# Patient Record
Sex: FEMALE | Race: WHITE | NOT HISPANIC OR LATINO
[De-identification: names, ages, dates, MRNs, and addresses within clinical notes are randomized per-mention and may not be internally consistent; named-entity substitution may affect disease eponyms.]

---

## 2020-01-22 PROBLEM — Z00.00 ENCOUNTER FOR PREVENTIVE HEALTH EXAMINATION: Status: ACTIVE | Noted: 2020-01-22

## 2020-01-31 ENCOUNTER — TRANSCRIPTION ENCOUNTER (OUTPATIENT)
Age: 25
End: 2020-01-31

## 2020-01-31 ENCOUNTER — LABORATORY RESULT (OUTPATIENT)
Age: 25
End: 2020-01-31

## 2020-01-31 ENCOUNTER — APPOINTMENT (OUTPATIENT)
Dept: NEUROLOGY | Facility: CLINIC | Age: 25
End: 2020-01-31
Payer: COMMERCIAL

## 2020-01-31 VITALS
HEART RATE: 76 BPM | SYSTOLIC BLOOD PRESSURE: 124 MMHG | HEIGHT: 68 IN | WEIGHT: 130 LBS | OXYGEN SATURATION: 100 % | DIASTOLIC BLOOD PRESSURE: 76 MMHG | BODY MASS INDEX: 19.7 KG/M2 | TEMPERATURE: 98.4 F

## 2020-01-31 DIAGNOSIS — Z78.9 OTHER SPECIFIED HEALTH STATUS: ICD-10-CM

## 2020-01-31 DIAGNOSIS — Z87.2 PERSONAL HISTORY OF DISEASES OF THE SKIN AND SUBCUTANEOUS TISSUE: ICD-10-CM

## 2020-01-31 DIAGNOSIS — R46.81 OBSESSIVE-COMPULSIVE BEHAVIOR: ICD-10-CM

## 2020-01-31 DIAGNOSIS — F32.9 ANXIETY DISORDER, UNSPECIFIED: ICD-10-CM

## 2020-01-31 DIAGNOSIS — F41.9 ANXIETY DISORDER, UNSPECIFIED: ICD-10-CM

## 2020-01-31 PROCEDURE — 99205 OFFICE O/P NEW HI 60 MIN: CPT

## 2020-01-31 RX ORDER — DOXEPIN HYDROCHLORIDE 25 MG/1
25 CAPSULE ORAL DAILY
Refills: 0 | Status: ACTIVE | COMMUNITY

## 2020-01-31 RX ORDER — FLUVOXAMINE MALEATE 100 MG/1
100 TABLET, FILM COATED ORAL TWICE DAILY
Refills: 0 | Status: ACTIVE | COMMUNITY

## 2020-01-31 NOTE — PHYSICAL EXAM
[FreeTextEntry1] : Physical Exam\par Constitutional: no apparent distress\par Psychiatric: normal affect, euthyhmic, alert and oriented x 3\par HEENT: moist mucous membranes, oropharynx clear\par Neck: supple, no lymphadenopathy\par Respiratory: clear to auscultation bilaterally, no crackles or wheezing\par Cardiovascular: regular rate and rhythm, normal S1/S2, no murmurs, no edema\par GI: soft, non-tender, no distended, bowel sounds present; no hepatosplenomegaly on palpation\par Musculoskeletal: extremities warm, well-perfused, normal range of motion\par Skin: no rashes, bruises, or lesions\par \par Neurologic Exam:\par Mental Status: MOCA 29/30 (missed 1 word on delayed recall)\par Cranial Nerves: I: deferred; II: pupils equal round and reactive, visual fields full to confrontation, discs sharp bilaterally; III, IV, VI: extraocular movements full with no nystagmus; V: facial sensation intact and symmetric; VII: facial power symmetric; VIII: hearing intact to finger rub; IX/X: palate elevates symmetrically, no dysarthria; XI: shoulder shrug symmetric; XII: tongue protrudes midline\par Motor: normal bulk and tone, no orbiting or pronator drift, power 5/5 to confrontation throughout including shoulder abduction, elbow flexion and extension, wrist flexion and extension, hip flexion, knee flexion and extension, plantarflexion, and dorsiflexion, no abnormal movements\par Sensation: intact to light touch in distal upper and lower extremities bilaterally\par Coordination: finger-nose-finger intact bilaterally\par Reflexes: 2+ biceps, triceps, brachioradialis, patella\par Gait: narrow base, normal stance and stride, normal arm swing

## 2020-01-31 NOTE — HISTORY OF PRESENT ILLNESS
[FreeTextEntry1] : 24-year-old woman who presents with both parents for evaluation of possible inflammatory condition of the nervous system.\par \par Patient and parents report that she has a long history of psychiatric issues.  She had normal birth and development until age 9.  At 9 she was diagnosed with scarlet fever.  Soon afterward she began to have severe anxiety, became easily overwhelmed, and developed compulsions (showering rituals, specific ways she needed things to be done).  Throughout middle and high school she continued to have these symptoms and tried multiple antidepressants without improvement.  \par \par In college these symptoms continued and she also developed "brain fog," difficulty concentrating, abdominal distress, decreased PO intake, intermittent severe depression.  Tried several more antidepressants and stimulants with minimal improvement.\par \par Over these years also had several strep infections as well as mono.  She was diagnosed with chronic inflammatory response syndrome.  She had a tonsillectomy in 2017 due to concern that she may have PANDAS and that strep infections may be related to her psychiatric symptoms.  Has had multiple courses of antibiotics without much change.  \par \par Over the past month her symptoms have dramatically worsening.  She has severe compulsions, will not let anyone else clean the house or prepare meals as she needs it done a certain way.  Spends hours in the shower as she has to do everything in a specific order.  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  Has not been able to eat and has lost 15 pounds.  Recently started a course of PO prednisone which she and parents say has helped somewhat.\par \par

## 2020-01-31 NOTE — DATA REVIEWED
[de-identified] : MRI brain personally reviewed, normal on my read though NeuroQuant read states moderate atrophy for age.  No hyperintensity.

## 2020-01-31 NOTE — DISCUSSION/SUMMARY
[FreeTextEntry1] : 24-year-old woman with very long history of OCD, anxiety, and depression, without response to psychiatric medications, and with recent possible response (though incomplete) to oral steroids.  Differential diagnosis includes primary psychiatric disease vs. inflammatory disorder of the nervous system such as limbic encephalitis.  15 year duration and normal cognitive/neurologic exams as well as normal MRI argue against CNS inflammation, though possible response to prednisone is encouraging.  \par \par Recommend lumbar puncture to look for evidence of inflammation, serum and CSF autoantibody testing, video EEG to look for unrecognized seizures, and PET scan to evaluate for hypometabolism.  If all normal, unlikely this is inflammatory and would not recommend treatment.  If testing is suggestive of inflammation, will initiate treatment with high dose IV steroids.  \par \par Of note, the relationship between infections and neuropsychiatric disorders remains an area of debate, and although she does have a history of strep infections and mono, all were appropriately treated and I do not think she is actively infected or would benefit from additional antibiotic or antiviral treatment at this time.\par \par 60 minutes spent on this encounter of which >50% was spent counseling patient and parents as described above.

## 2020-01-31 NOTE — REASON FOR VISIT
[Initial Evaluation] : an initial evaluation [Parent] : parent [FreeTextEntry1] : neuropsychiatric abnormalities

## 2020-02-01 LAB
24R-OH-CALCIDIOL SERPL-MCNC: 91 PG/ML
25(OH)D3 SERPL-MCNC: 35 NG/ML
C3 SERPL-MCNC: 108 MG/DL
C4 SERPL-MCNC: 17 MG/DL
CRP SERPL-MCNC: <0.1 MG/DL
DEPRECATED KAPPA LC FREE/LAMBDA SER: 0.94 RATIO
ERYTHROCYTE [SEDIMENTATION RATE] IN BLOOD BY WESTERGREN METHOD: 2 MM/HR
IGA SER QL IEP: 155 MG/DL
IGG SER QL IEP: 1348 MG/DL
IGM SER QL IEP: 93 MG/DL
KAPPA LC CSF-MCNC: 1.01 MG/DL
KAPPA LC SERPL-MCNC: 0.95 MG/DL
RHEUMATOID FACT SER QL: <10 IU/ML
T3RU NFR SERPL: 0.9 TBI
T4 SERPL-MCNC: 6.2 UG/DL
TSH SERPL-ACNC: 0.54 UIU/ML

## 2020-02-02 LAB
THYROGLOB AB SERPL-ACNC: <20 IU/ML
THYROGLOB SERPL-MCNC: 14.5 NG/ML
THYROPEROXIDASE AB SERPL IA-ACNC: 25.2 IU/ML

## 2020-02-03 LAB
ALBUMIN MFR SERPL ELPH: 61.7 %
ALBUMIN SERPL-MCNC: 4.6 G/DL
ALBUMIN/GLOB SERPL: 1.6 RATIO
ALPHA1 GLOB MFR SERPL ELPH: 3.2 %
ALPHA1 GLOB SERPL ELPH-MCNC: 0.2 G/DL
ALPHA2 GLOB MFR SERPL ELPH: 8.9 %
ALPHA2 GLOB SERPL ELPH-MCNC: 0.7 G/DL
ANA SER IF-ACNC: NEGATIVE
B-GLOBULIN MFR SERPL ELPH: 9.2 %
B-GLOBULIN SERPL ELPH-MCNC: 0.7 G/DL
CH50 SERPL-MCNC: 34 U/ML
CONFIRM: 26.6 SEC
CRYOGLOB SERPL-MCNC: NEGATIVE
DRVVT IMM 1:2 NP PPP: NORMAL
DRVVT SCREEN TO CONFIRM RATIO: 0.87 RATIO
GAMMA GLOB FLD ELPH-MCNC: 1.3 G/DL
GAMMA GLOB MFR SERPL ELPH: 17 %
INTERPRETATION SERPL IEP-IMP: NORMAL
M PROTEIN SPEC IFE-MCNC: NORMAL
MPO AB + PR3 PNL SER: NORMAL
NMDA RECEPTOR AB N-METHYL-D-ASPARTATE RECEPTOR AB IGG, SERUM WITH REFLEX TO TITER: NORMAL
PROT SERPL-MCNC: 7.4 G/DL
PROT SERPL-MCNC: 7.4 G/DL
SCREEN DRVVT: 25.6 SEC

## 2020-02-04 LAB
ACE BLD-CCNC: 33 U/L
B2 GLYCOPROT1 AB SER QL: NEGATIVE
DSDNA AB SER-ACNC: <12 IU/ML
ENA SS-A AB SER IA-ACNC: <0.2 AL
ENA SS-B AB SER IA-ACNC: <0.2 AL
GLIADIN IGA SER QL: 5 UNITS
GLIADIN IGG SER QL: <5 UNITS
GLIADIN PEPTIDE IGA SER-ACNC: NEGATIVE
GLIADIN PEPTIDE IGG SER-ACNC: NEGATIVE
VGCC-P/Q BIND AB SER-SCNC: 0 PMOL/L

## 2020-02-05 LAB
ACHR BIND AB SER-ACNC: <0.3 NMOL/L
CARDIOLIPIN IGM SER-MCNC: 6 MPL
CARDIOLIPIN IGM SER-MCNC: 7 GPL
ENDOMYSIUM IGA SER QL: NEGATIVE
ENDOMYSIUM IGA TITR SER: NORMAL
HU AB SER QL: NORMAL

## 2020-02-06 LAB
CASPR2 IGG SERPL QL IF: NORMAL
LGI1 IGG SERPL QL IF: NORMAL
PURKINJE CELLS AB SER QL IF: NEGATIVE
VGKC AB SER-SCNC: 0 PMOL/L

## 2020-02-12 ENCOUNTER — INPATIENT (INPATIENT)
Facility: HOSPITAL | Age: 25
LOS: 1 days | Discharge: HOME | DRG: 99 | End: 2020-02-14
Attending: PSYCHIATRY & NEUROLOGY | Admitting: PSYCHIATRY & NEUROLOGY
Payer: COMMERCIAL

## 2020-02-12 VITALS
DIASTOLIC BLOOD PRESSURE: 79 MMHG | RESPIRATION RATE: 16 BRPM | SYSTOLIC BLOOD PRESSURE: 118 MMHG | HEIGHT: 67 IN | HEART RATE: 68 BPM | OXYGEN SATURATION: 98 % | WEIGHT: 141.1 LBS | TEMPERATURE: 98 F

## 2020-02-12 DIAGNOSIS — G04.81 OTHER ENCEPHALITIS AND ENCEPHALOMYELITIS: ICD-10-CM

## 2020-02-12 DIAGNOSIS — Z90.89 ACQUIRED ABSENCE OF OTHER ORGANS: Chronic | ICD-10-CM

## 2020-02-12 LAB
ALBUMIN SERPL ELPH-MCNC: 4.2 G/DL — SIGNIFICANT CHANGE UP (ref 3.3–5)
ALP SERPL-CCNC: 60 U/L — SIGNIFICANT CHANGE UP (ref 40–120)
ALT FLD-CCNC: 13 U/L — SIGNIFICANT CHANGE UP (ref 10–45)
ANION GAP SERPL CALC-SCNC: 11 MMOL/L — SIGNIFICANT CHANGE UP (ref 5–17)
APPEARANCE CSF: CLEAR — SIGNIFICANT CHANGE UP
APPEARANCE SPUN FLD: COLORLESS — SIGNIFICANT CHANGE UP
APTT BLD: 25.7 SEC — LOW (ref 27.5–36.3)
AST SERPL-CCNC: 14 U/L — SIGNIFICANT CHANGE UP (ref 10–40)
BASOPHILS # BLD AUTO: 0.02 K/UL — SIGNIFICANT CHANGE UP (ref 0–0.2)
BASOPHILS NFR BLD AUTO: 0.1 % — SIGNIFICANT CHANGE UP (ref 0–2)
BILIRUB SERPL-MCNC: 0.4 MG/DL — SIGNIFICANT CHANGE UP (ref 0.2–1.2)
BUN SERPL-MCNC: 17 MG/DL — SIGNIFICANT CHANGE UP (ref 7–23)
CALCIUM SERPL-MCNC: 9.3 MG/DL — SIGNIFICANT CHANGE UP (ref 8.4–10.5)
CHLORIDE SERPL-SCNC: 103 MMOL/L — SIGNIFICANT CHANGE UP (ref 96–108)
CO2 SERPL-SCNC: 26 MMOL/L — SIGNIFICANT CHANGE UP (ref 22–31)
COLOR CSF: SIGNIFICANT CHANGE UP
CREAT SERPL-MCNC: 0.88 MG/DL — SIGNIFICANT CHANGE UP (ref 0.5–1.3)
CSF PCR RESULT: SIGNIFICANT CHANGE UP
EOSINOPHIL # BLD AUTO: 0 K/UL — SIGNIFICANT CHANGE UP (ref 0–0.5)
EOSINOPHIL NFR BLD AUTO: 0 % — SIGNIFICANT CHANGE UP (ref 0–6)
GLUCOSE CSF-MCNC: 84 MG/DL — HIGH (ref 40–70)
GLUCOSE SERPL-MCNC: 120 MG/DL — HIGH (ref 70–99)
HCG SERPL-ACNC: <0 MIU/ML — SIGNIFICANT CHANGE UP
HCT VFR BLD CALC: 39.4 % — SIGNIFICANT CHANGE UP (ref 34.5–45)
HGB BLD-MCNC: 13 G/DL — SIGNIFICANT CHANGE UP (ref 11.5–15.5)
IMM GRANULOCYTES NFR BLD AUTO: 0.5 % — SIGNIFICANT CHANGE UP (ref 0–1.5)
INR BLD: 1 — SIGNIFICANT CHANGE UP (ref 0.88–1.16)
LYMPHOCYTES # BLD AUTO: 1.27 K/UL — SIGNIFICANT CHANGE UP (ref 1–3.3)
LYMPHOCYTES # BLD AUTO: 9.5 % — LOW (ref 13–44)
MAGNESIUM SERPL-MCNC: 2.1 MG/DL — SIGNIFICANT CHANGE UP (ref 1.6–2.6)
MCHC RBC-ENTMCNC: 31 PG — SIGNIFICANT CHANGE UP (ref 27–34)
MCHC RBC-ENTMCNC: 33 GM/DL — SIGNIFICANT CHANGE UP (ref 32–36)
MCV RBC AUTO: 93.8 FL — SIGNIFICANT CHANGE UP (ref 80–100)
MONOCYTES # BLD AUTO: 0.18 K/UL — SIGNIFICANT CHANGE UP (ref 0–0.9)
MONOCYTES NFR BLD AUTO: 1.3 % — LOW (ref 2–14)
NEUTROPHILS # BLD AUTO: 11.82 K/UL — HIGH (ref 1.8–7.4)
NEUTROPHILS # CSF: 0 % — SIGNIFICANT CHANGE UP (ref 0–6)
NEUTROPHILS NFR BLD AUTO: 88.6 % — HIGH (ref 43–77)
NRBC # BLD: 0 /100 WBCS — SIGNIFICANT CHANGE UP (ref 0–0)
NRBC NFR CSF: 0 /UL — SIGNIFICANT CHANGE UP (ref 0–5)
PHOSPHATE SERPL-MCNC: 3.3 MG/DL — SIGNIFICANT CHANGE UP (ref 2.5–4.5)
PLATELET # BLD AUTO: 305 K/UL — SIGNIFICANT CHANGE UP (ref 150–400)
POTASSIUM SERPL-MCNC: 4 MMOL/L — SIGNIFICANT CHANGE UP (ref 3.5–5.3)
POTASSIUM SERPL-SCNC: 4 MMOL/L — SIGNIFICANT CHANGE UP (ref 3.5–5.3)
PROT CSF-MCNC: 30 MG/DL — SIGNIFICANT CHANGE UP (ref 15–45)
PROT SERPL-MCNC: 6.7 G/DL — SIGNIFICANT CHANGE UP (ref 6–8.3)
PROTHROM AB SERPL-ACNC: 11.4 SEC — SIGNIFICANT CHANGE UP (ref 10–12.9)
RBC # BLD: 4.2 M/UL — SIGNIFICANT CHANGE UP (ref 3.8–5.2)
RBC # CSF: 0 /UL — SIGNIFICANT CHANGE UP (ref 0–0)
RBC # FLD: 12.8 % — SIGNIFICANT CHANGE UP (ref 10.3–14.5)
SODIUM SERPL-SCNC: 140 MMOL/L — SIGNIFICANT CHANGE UP (ref 135–145)
TUBE TYPE: SIGNIFICANT CHANGE UP
WBC # BLD: 13.36 K/UL — HIGH (ref 3.8–10.5)
WBC # FLD AUTO: 13.36 K/UL — HIGH (ref 3.8–10.5)

## 2020-02-12 PROCEDURE — 93010 ELECTROCARDIOGRAM REPORT: CPT

## 2020-02-12 PROCEDURE — 99223 1ST HOSP IP/OBS HIGH 75: CPT

## 2020-02-12 RX ORDER — OMEGA-3 ACID ETHYL ESTERS 1 G
1 CAPSULE ORAL
Refills: 0 | Status: DISCONTINUED | OUTPATIENT
Start: 2020-02-12 | End: 2020-02-14

## 2020-02-12 RX ORDER — FLUVOXAMINE MALEATE 25 MG/1
100 TABLET ORAL
Refills: 0 | Status: DISCONTINUED | OUTPATIENT
Start: 2020-02-12 | End: 2020-02-14

## 2020-02-12 RX ORDER — DOXEPIN HCL 100 MG
25 CAPSULE ORAL AT BEDTIME
Refills: 0 | Status: DISCONTINUED | OUTPATIENT
Start: 2020-02-12 | End: 2020-02-14

## 2020-02-12 RX ORDER — OMALIZUMAB 150 MG/ML
2 INJECTION, SOLUTION SUBCUTANEOUS
Qty: 0 | Refills: 0 | DISCHARGE

## 2020-02-12 RX ORDER — FLUVOXAMINE MALEATE 25 MG/1
1 TABLET ORAL
Qty: 0 | Refills: 0 | DISCHARGE

## 2020-02-12 RX ORDER — ACETAMINOPHEN 500 MG
650 TABLET ORAL ONCE
Refills: 0 | Status: COMPLETED | OUTPATIENT
Start: 2020-02-12 | End: 2020-02-12

## 2020-02-12 RX ORDER — DOXEPIN HCL 100 MG
1 CAPSULE ORAL
Qty: 0 | Refills: 0 | DISCHARGE

## 2020-02-12 RX ORDER — FOLIC ACID 0.8 MG
1 TABLET ORAL
Qty: 0 | Refills: 0 | DISCHARGE

## 2020-02-12 RX ORDER — SODIUM CHLORIDE 9 MG/ML
1000 INJECTION, SOLUTION INTRAVENOUS ONCE
Refills: 0 | Status: COMPLETED | OUTPATIENT
Start: 2020-02-12 | End: 2020-02-12

## 2020-02-12 RX ORDER — FOLIC ACID 0.8 MG
1 TABLET ORAL
Refills: 0 | Status: DISCONTINUED | OUTPATIENT
Start: 2020-02-12 | End: 2020-02-14

## 2020-02-12 RX ORDER — ACETAMINOPHEN 500 MG
650 TABLET ORAL EVERY 6 HOURS
Refills: 0 | Status: DISCONTINUED | OUTPATIENT
Start: 2020-02-12 | End: 2020-02-14

## 2020-02-12 RX ORDER — NALTREXONE HYDROCHLORIDE 50 MG/1
4.5 TABLET, FILM COATED ORAL
Qty: 0 | Refills: 0 | DISCHARGE

## 2020-02-12 RX ORDER — OMEGA-3 ACID ETHYL ESTERS 1 G
2 CAPSULE ORAL
Qty: 0 | Refills: 0 | DISCHARGE

## 2020-02-12 RX ORDER — NALTREXONE HYDROCHLORIDE 50 MG/1
4.5 TABLET, FILM COATED ORAL
Refills: 0 | Status: DISCONTINUED | OUTPATIENT
Start: 2020-02-12 | End: 2020-02-12

## 2020-02-12 RX ADMIN — Medication 1 MILLIGRAM(S): at 22:04

## 2020-02-12 RX ADMIN — SODIUM CHLORIDE 1000 MILLILITER(S): 9 INJECTION, SOLUTION INTRAVENOUS at 18:54

## 2020-02-12 RX ADMIN — Medication 650 MILLIGRAM(S): at 18:24

## 2020-02-12 RX ADMIN — FLUVOXAMINE MALEATE 100 MILLIGRAM(S): 25 TABLET ORAL at 22:04

## 2020-02-12 RX ADMIN — Medication 650 MILLIGRAM(S): at 19:26

## 2020-02-12 RX ADMIN — Medication 25 MILLIGRAM(S): at 22:04

## 2020-02-12 NOTE — H&P ADULT - ASSESSMENT
24 year-old-right-handed woman with PMH of scarlet fever at age 9, anxiety, depression, OCD, and constellation of symptoms who presents today for work-up for autoimmune encephalitis.

## 2020-02-12 NOTE — H&P ADULT - HISTORY OF PRESENT ILLNESS
This is a 24 year-old-right-handed woman with PMH of scarlet fever at age 9, anxiety, depression, OCD, and constellation of symptoms who presents today for work-up for autoimmune encephalitis. As per patient she experienced neuropsychiatric symptoms (anxiety, severe depression and OCD, panic attacks and feeling overwhelmed) that all started after she was diagnosed with scarlet fever, and has tried many agents (mostly SSRIs) without much improvement. She managed to complete high school, and college successfully despite her psychiatric manifestations, and graduated with a high GPA. However, she enrolled in Graduate school, and other symptoms evolved such as a decline in executive functioning, "brain fog", thought process, decision making and extreme depression with moments of inability to get out of bed and eat. Patient report that the symptoms were somewhat cyclical since at times she was full of energy and other times, she was unable to function. Patient report that she was infected with strep several times over the years, and mono, had a tonsillectomy in 2017 for suspected PANDAS and strep may have contributed to her psychiatric symptoms, but she contracted strep last year may, and was diagnosed with chronic inflammatory response syndrome. Patient denies any seizure like activities over the years. Patient had a MRI on that showed no acute pathology, however the NeuroQant showed moderate atrophy for age. MOCA performed by DR Bank outpatient, and she scored 29/30. Of note, patient states that she was started on prednisone 2 weeks ago, and has been doing very well. This is a 24 year-old-right-handed woman with PMH of scarlet fever at age 9, anxiety, depression, OCD, and constellation of symptoms who presents today for work-up for autoimmune encephalitis. As per patient she experienced neuropsychiatric symptoms (anxiety, severe depression and OCD, panic attacks and feeling overwhelmed) that all started after she was diagnosed with scarlet fever, and has tried many agents (mostly SSRIs) without much improvement. She managed to complete high school, and college successfully despite her psychiatric manifestations, and graduated with a high GPA. However, she enrolled in Graduate school, and other symptoms evolved such as a decline in executive functioning, "brain fog", thought process, decision making and extreme depression with moments of inability to get out of bed and eat. Patient report that the symptoms were somewhat cyclical since at times she was full of energy and other times, she was unable to function. Patient report that she was infected with strep several times over the years, and mono, had a tonsillectomy in 2017 for suspected PANDAS and strep may have contributed to her psychiatric symptoms, but she contracted strep last year may, and was diagnosed with chronic inflammatory response syndrome. Patient denies any convulsive movements over the years. Patient had a MRI on that showed no acute pathology, however the NeuroQant PET showed moderate atrophy for age. MOCA performed by DR Bank outpatient, and she scored 29/30. Of note, patient states that she was started on prednisone 2 weeks ago, and has been doing very well at 40mg/d, but doing more poorly at 30mg/d.

## 2020-02-12 NOTE — H&P ADULT - NSHPREVIEWOFSYSTEMS_GEN_ALL_CORE
Review of Systems:  CONSTITUTIONAL:  No weight loss, fever, chills, weakness or fatigue.  HEENT:  Eyes:  No visual loss, blurred vision, double vision or yellow sclerae. Ears, Nose, Throat:  No hearing loss, sneezing, congestion, runny nose or sore throat.  SKIN:  No rash or itching.  CARDIOVASCULAR:  No chest pain, chest pressure or chest discomfort. No palpitations or edema.  RESPIRATORY:  No shortness of breath, cough or sputum.  GASTROINTESTINAL:  No anorexia, nausea, vomiting or diarrhea. No abdominal pain or blood.  GENITOURINARY: No Burning on urination.   NEUROLOGICAL:  see HPI  MUSCULOSKELETAL:  No muscle, back pain, joint pain or stiffness.  HEMATOLOGIC:  No anemia, bleeding or bruising.  LYMPHATICS:  No enlarged nodes. No history of splenectomy.  PSYCHIATRIC:  No history of depression or anxiety.  ENDOCRINOLOGIC:  No reports of sweating, cold or heat intolerance. No polyuria or polydipsia.  ALLERGIES:  No history of asthma, hives, eczema or rhinitis. Review of Systems:  CONSTITUTIONAL:  No weight loss, fever, chills, weakness or fatigue.  HEENT:  Eyes:  No visual loss, blurred vision, double vision or yellow sclerae. Ears, Nose, Throat:  No hearing loss, sneezing, congestion, runny nose or sore throat.  SKIN:  No rash or itching.  CARDIOVASCULAR:  No chest pain, chest pressure or chest discomfort. No palpitations or edema.  RESPIRATORY:  No shortness of breath, cough or sputum.  GASTROINTESTINAL:  No anorexia, nausea, vomiting or diarrhea. No abdominal pain or blood.  GENITOURINARY: No Burning on urination.   NEUROLOGICAL:  see HPI  MUSCULOSKELETAL:  No muscle, back pain, joint pain or stiffness.  HEMATOLOGIC:  No anemia, bleeding or bruising.  LYMPHATICS:  No enlarged nodes. No history of splenectomy.  PSYCHIATRIC:  History of anxiety, depression, panic attacks and OCD  ENDOCRINOLOGIC:  No reports of sweating, cold or heat intolerance. No polyuria or polydipsia.  ALLERGIES:  No history of asthma, hives, eczema or rhinitis.

## 2020-02-12 NOTE — H&P ADULT - NSHPSOCIALHISTORY_GEN_ALL_CORE
Live at home with parents.  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 Former marijuana use during high school and college. Denies current alcohol intake. Live at home with parents.  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 Former marijuana use during high school and college. Denies current alcohol intake. Has a boyfriend

## 2020-02-12 NOTE — H&P ADULT - PROBLEM SELECTOR PLAN 1
1. Admit to EMU  2. VEEG monitoring for 2 days  3. Ativan 2mg IVP PRN for seizures > 2mins  4. Labs  5. Neurological assessment Q8hrs  6. Seizure & Fall precautions  7. SCDs for DVT prophylaxis  8. LP with CSF autoimmune studies 1. Admit to EMU  2. VEEG monitoring for 2 days to evaluate for subclinical seizures  3. Ativan 2mg IVP PRN for seizures > 2mins  4. Labs  5. Neurological assessment Q8hrs  6. Seizure & Fall precautions  7. SCDs for DVT prophylaxis  8. LP with CSF autoimmune studies to evaluate for evidence of inflammation

## 2020-02-12 NOTE — H&P ADULT - NSHPPHYSICALEXAM_GEN_ALL_CORE
General Adult Exam  GENERAL APPEARANCE: Anxious appearing young woman in no apparent distress  EYES: PERRLA 2mm brisk, EOMI. vision is grossly intact.  EARS: External auditory canals and tympanic membranes clear, hearing grossly intact.  NOSE: No nasal discharge.  CARDIAC: Normal S1 and S2. No S3, S4 or murmurs. Rhythm is regular. There is no peripheral edema, cyanosis or pallor. Extremities are warm and well perfused. Capillary refill is less than 2 seconds. No carotid bruits.  LUNGS: Clear to auscultation  ABDOMEN: Positive bowel sounds. Soft, nondistended, nontender. No guarding or rebound. No masses.  MUSCULOSKELETAL: Normal muscular development. Normal gait.  EXTREMITIES: No significant deformity or joint abnormality. No edema. Peripheral pulses intact. No varicosities.  SKIN: Skin normal color, texture and turgor with no lesions or eruptions.    Neurological Exam:  Mental Status: Alert and Orientated to self, date and place.  Attention intact.  No dysarthria, aphasia or neglect.  Knowledge intact.  Registration intact.  Short and long term memory grossly intact.      Cranial Nerves: CN I - not tested.  PERRLA 2mm brisk, EOMI, VFF, no nystagmus or diplopia.  CN V1-3 intact to light touch.  No facial asymmetry.  Hearing intact to finger rub bilaterally.  Tongue, uvula and palate midline.  Sternocleidomastoid and Trapezius intact bilaterally.    Motor:   Tone: normal.                  Strength:     [] Upper extremity                      Delt       Bicep    Tricep                                                  R         5/5        5/5        5/5       5/5                                               L          5/5        5/5        5/5       5/5  [] Lower extremity                       HF          KE          KF        DF         PF                                               R        5/5        5/5        5/5       5/5       5/5                                               L         5/5        5/5       5/5       5/5        5/5  Pronator drift: none                 Dysmetria: None to finger-nose-finger   Sensation: intact to light touch  Deep Tendon Reflexes: 2+ bilateral biceps, triceps, brachioradialis, knee and ankle  Toes flexor bilaterally    Gait: Narrow-based gait, heel, toe and tandem walking normal. Negative Romberg's.

## 2020-02-13 DIAGNOSIS — Z29.9 ENCOUNTER FOR PROPHYLACTIC MEASURES, UNSPECIFIED: ICD-10-CM

## 2020-02-13 DIAGNOSIS — R63.8 OTHER SYMPTOMS AND SIGNS CONCERNING FOOD AND FLUID INTAKE: ICD-10-CM

## 2020-02-13 DIAGNOSIS — Z91.89 OTHER SPECIFIED PERSONAL RISK FACTORS, NOT ELSEWHERE CLASSIFIED: ICD-10-CM

## 2020-02-13 LAB
% ALBUMIN: 62.3 % — SIGNIFICANT CHANGE UP
% ALPHA 1: 3.3 % — SIGNIFICANT CHANGE UP
% ALPHA 2: 8.6 % — SIGNIFICANT CHANGE UP
% BETA: 9.4 % — SIGNIFICANT CHANGE UP
% GAMMA: 16.4 % — SIGNIFICANT CHANGE UP
ALBUMIN CSF-MCNC: 18.1 MG/DL — SIGNIFICANT CHANGE UP (ref 14–25)
ALBUMIN SERPL ELPH-MCNC: 4 G/DL — SIGNIFICANT CHANGE UP (ref 3.6–5.5)
ALBUMIN SERPL ELPH-MCNC: 4029 MG/DL — SIGNIFICANT CHANGE UP (ref 3500–5200)
ALBUMIN/GLOB SERPL ELPH: 1.6 RATIO — SIGNIFICANT CHANGE UP
ALPHA1 GLOB SERPL ELPH-MCNC: 0.2 G/DL — SIGNIFICANT CHANGE UP (ref 0.1–0.4)
ALPHA2 GLOB SERPL ELPH-MCNC: 0.6 G/DL — SIGNIFICANT CHANGE UP (ref 0.5–1)
ANION GAP SERPL CALC-SCNC: 10 MMOL/L — SIGNIFICANT CHANGE UP (ref 5–17)
B-GLOBULIN SERPL ELPH-MCNC: 0.6 G/DL — SIGNIFICANT CHANGE UP (ref 0.5–1)
BUN SERPL-MCNC: 17 MG/DL — SIGNIFICANT CHANGE UP (ref 7–23)
CALCIUM SERPL-MCNC: 8.7 MG/DL — SIGNIFICANT CHANGE UP (ref 8.4–10.5)
CHLORIDE SERPL-SCNC: 105 MMOL/L — SIGNIFICANT CHANGE UP (ref 96–108)
CO2 SERPL-SCNC: 25 MMOL/L — SIGNIFICANT CHANGE UP (ref 22–31)
CREAT SERPL-MCNC: 1.01 MG/DL — SIGNIFICANT CHANGE UP (ref 0.5–1.3)
CRYPTOC AG CSF-ACNC: NEGATIVE — SIGNIFICANT CHANGE UP
GAMMA GLOBULIN: 1.1 G/DL — SIGNIFICANT CHANGE UP (ref 0.6–1.6)
GLUCOSE SERPL-MCNC: 91 MG/DL — SIGNIFICANT CHANGE UP (ref 70–99)
HCT VFR BLD CALC: 35.7 % — SIGNIFICANT CHANGE UP (ref 34.5–45)
HGB BLD-MCNC: 11.8 G/DL — SIGNIFICANT CHANGE UP (ref 11.5–15.5)
IGG CSF-MCNC: 4 MG/DL — HIGH
IGG FLD-MCNC: 1218 MG/DL — SIGNIFICANT CHANGE UP (ref 610–1660)
IGG SYNTH RATE SER+CSF CALC-MRATE: 3.1 MG/DAY — SIGNIFICANT CHANGE UP
IGG/ALB CLEAR SER+CSF-RTO: 0.7 — SIGNIFICANT CHANGE UP
IGG/ALB CSF: 0.22 RATIO — SIGNIFICANT CHANGE UP
IGG/ALB SER: 0.3 RATIO — SIGNIFICANT CHANGE UP
MAGNESIUM SERPL-MCNC: 2 MG/DL — SIGNIFICANT CHANGE UP (ref 1.6–2.6)
MCHC RBC-ENTMCNC: 30.9 PG — SIGNIFICANT CHANGE UP (ref 27–34)
MCHC RBC-ENTMCNC: 33.1 GM/DL — SIGNIFICANT CHANGE UP (ref 32–36)
MCV RBC AUTO: 93.5 FL — SIGNIFICANT CHANGE UP (ref 80–100)
NRBC # BLD: 0 /100 WBCS — SIGNIFICANT CHANGE UP (ref 0–0)
PCP SPEC-MCNC: SIGNIFICANT CHANGE UP
PLATELET # BLD AUTO: 264 K/UL — SIGNIFICANT CHANGE UP (ref 150–400)
POTASSIUM SERPL-MCNC: 3.3 MMOL/L — LOW (ref 3.5–5.3)
POTASSIUM SERPL-SCNC: 3.3 MMOL/L — LOW (ref 3.5–5.3)
PROT PATTERN SERPL ELPH-IMP: SIGNIFICANT CHANGE UP
PROT SERPL-MCNC: 6.5 G/DL — SIGNIFICANT CHANGE UP (ref 6–8.3)
PROT SERPL-MCNC: 6.5 G/DL — SIGNIFICANT CHANGE UP (ref 6–8.3)
RBC # BLD: 3.82 M/UL — SIGNIFICANT CHANGE UP (ref 3.8–5.2)
RBC # FLD: 12.9 % — SIGNIFICANT CHANGE UP (ref 10.3–14.5)
SODIUM SERPL-SCNC: 140 MMOL/L — SIGNIFICANT CHANGE UP (ref 135–145)
WBC # BLD: 14.31 K/UL — HIGH (ref 3.8–10.5)
WBC # FLD AUTO: 14.31 K/UL — HIGH (ref 3.8–10.5)

## 2020-02-13 PROCEDURE — 99232 SBSQ HOSP IP/OBS MODERATE 35: CPT

## 2020-02-13 PROCEDURE — 95720 EEG PHY/QHP EA INCR W/VEEG: CPT

## 2020-02-13 RX ORDER — POTASSIUM CHLORIDE 20 MEQ
40 PACKET (EA) ORAL EVERY 4 HOURS
Refills: 0 | Status: COMPLETED | OUTPATIENT
Start: 2020-02-13 | End: 2020-02-13

## 2020-02-13 RX ORDER — IBUPROFEN 200 MG
600 TABLET ORAL EVERY 8 HOURS
Refills: 0 | Status: DISCONTINUED | OUTPATIENT
Start: 2020-02-13 | End: 2020-02-14

## 2020-02-13 RX ORDER — IBUPROFEN 200 MG
400 TABLET ORAL ONCE
Refills: 0 | Status: COMPLETED | OUTPATIENT
Start: 2020-02-13 | End: 2020-02-13

## 2020-02-13 RX ADMIN — Medication 25 MILLIGRAM(S): at 21:56

## 2020-02-13 RX ADMIN — FLUVOXAMINE MALEATE 100 MILLIGRAM(S): 25 TABLET ORAL at 21:56

## 2020-02-13 RX ADMIN — Medication 40 MILLIGRAM(S): at 10:11

## 2020-02-13 RX ADMIN — Medication 650 MILLIGRAM(S): at 10:13

## 2020-02-13 RX ADMIN — Medication 400 MILLIGRAM(S): at 17:38

## 2020-02-13 RX ADMIN — Medication 40 MILLIEQUIVALENT(S): at 18:10

## 2020-02-13 RX ADMIN — Medication 125 MILLIGRAM(S): at 13:36

## 2020-02-13 RX ADMIN — Medication 400 MILLIGRAM(S): at 16:44

## 2020-02-13 RX ADMIN — Medication 650 MILLIGRAM(S): at 11:10

## 2020-02-13 RX ADMIN — Medication 40 MILLIEQUIVALENT(S): at 21:55

## 2020-02-13 RX ADMIN — Medication 1 MILLIGRAM(S): at 21:55

## 2020-02-13 RX ADMIN — Medication 125 MILLIGRAM(S): at 21:54

## 2020-02-13 RX ADMIN — Medication 1 GRAM(S): at 10:11

## 2020-02-13 RX ADMIN — Medication 650 MILLIGRAM(S): at 21:55

## 2020-02-13 RX ADMIN — Medication 650 MILLIGRAM(S): at 23:00

## 2020-02-13 RX ADMIN — FLUVOXAMINE MALEATE 100 MILLIGRAM(S): 25 TABLET ORAL at 10:11

## 2020-02-13 NOTE — CHART NOTE - NSCHARTNOTEFT_GEN_A_CORE
MoCA performed:  Visuospatial/Executive: 4/5  Naming: 3/3  Attention: 6/6  Language: 2/3  Abstraction: 2/2  Delayed Recall: 5/5  Orientation: 6/6  Total: 28/30

## 2020-02-13 NOTE — PROGRESS NOTE ADULT - PROBLEM SELECTOR PLAN 1
Patient with symptoms of "brain fog", anxiety, depression, OCD after a strep infection at age 9  -VEEG monitoring for 2 days to evaluate for subclinical seizures  -Ativan 2mg IVP PRN for seizures > 2mins  -Neurological assessment Q8hrs  -Seizure & Fall precautions  -LP with CSF autoimmune studies to evaluate for evidence of inflammation Patient with symptoms of "brain fog", anxiety, depression, OCD after a strep infection at age 9  -VEEG monitoring for 2 days to evaluate for subclinical seizures  -Ativan 2mg IVP PRN for seizures > 2mins  -Neurological assessment Q8hrs  -Seizure & Fall precautions  -LP with CSF autoimmune studies to evaluate for evidence of inflammation - studies pending  - will trial solumedrol 125mg x1 can repeat x 1 today.  Will watch for 'rage', glucose control.

## 2020-02-13 NOTE — PROGRESS NOTE ADULT - SUBJECTIVE AND OBJECTIVE BOX
VITAL SIGNS:  Vital Signs Last 24 Hrs  T(C): 36.6 (13 Feb 2020 11:05), Max: 36.8 (12 Feb 2020 21:28)  T(F): 97.8 (13 Feb 2020 11:05), Max: 98.3 (12 Feb 2020 21:28)  HR: 72 (13 Feb 2020 11:05) (68 - 84)  BP: 121/69 (13 Feb 2020 11:05) (118/64 - 121/69)  RR: 16 (13 Feb 2020 11:05) (16 - 16)  SpO2: 98% (13 Feb 2020 11:05) (97% - 98%)    PHYSICAL EXAM:    General: young female, resting comfortably in bed  HEENT: normocephalic, atraumatic; PERRL, anicteric sclera; MMM  Neck: supple, no JVD, no thyromegaly, no lymphadenopathy  Cardiovascular: +S1/S2, RRR, no M/G/R  Respiratory: clear to auscultation B/L; no wheezing, no rales, no rhonchi  Gastrointestinal: soft, NT/ND; +BSx4, no organomegaly  Extremities: WWP; no edema, clubbing or cyanosis  Vascular: 2+ radial, DP/PT pulses B/L  Neurological: AAOx3; no focal deficits; reflexes 2+; strength 5/5 in all extremities; no finger to nose or heel to shin dysmetria    MEDICATIONS:  MEDICATIONS  (STANDING):  doxepin 25 milliGRAM(s) Oral at bedtime  fluvoxaMINE 100 milliGRAM(s) Oral <User Schedule>  folic acid 1 milliGRAM(s) Oral <User Schedule>  methylPREDNISolone sodium succinate Injectable 125 milliGRAM(s) IV Push <User Schedule>  Naltrexone 4.5MG Capsule 1 Capsule(s) 1 Capsule(s) Oral at bedtime  omega-3-Acid Ethyl Esters 1 Gram(s) Oral <User Schedule>  potassium chloride   Powder 40 milliEquivalent(s) Oral every 4 hours  predniSONE   Tablet 40 milliGRAM(s) Oral <User Schedule>    MEDICATIONS  (PRN):  acetaminophen   Tablet .. 650 milliGRAM(s) Oral every 6 hours PRN Temp greater or equal to 38.5C (101.3F), Mild Pain (1 - 3)  ibuprofen  Tablet. 600 milliGRAM(s) Oral every 8 hours PRN Severe Pain (7 - 10)  LORazepam   Injectable 2 milliGRAM(s) IV Push every 4 hours PRN Seizure activity      ALLERGIES:  Allergies    cefdinir (Hives)    Intolerances        LABS:                        11.8   14.31 )-----------( 264      ( 13 Feb 2020 10:13 )             35.7     02-13    140  |  105  |  17  ----------------------------<  91  3.3<L>   |  25  |  1.01    Ca    8.7      13 Feb 2020 10:12  Phos  3.3     02-12  Mg     2.0     02-13    TPro  6.5  /  Alb  4.0  /  TBili  x   /  DBili  x   /  AST  x   /  ALT  x   /  AlkPhos  x   02-13    PT/INR - ( 12 Feb 2020 12:24 )   PT: 11.4 sec;   INR: 1.00          PTT - ( 12 Feb 2020 12:24 )  PTT:25.7 sec    CAPILLARY BLOOD GLUCOSE          RADIOLOGY & ADDITIONAL TESTS: Reviewed. Interval history:  Patient's lumbar puncture was uneventful.  However awoke this morning with tolerable but annoying headache.  Lumbar puncture initial results were unremarkable.  No seizures or seizure-like activity.      VITAL SIGNS:  Vital Signs Last 24 Hrs  T(C): 36.6 (13 Feb 2020 11:05), Max: 36.8 (12 Feb 2020 21:28)  T(F): 97.8 (13 Feb 2020 11:05), Max: 98.3 (12 Feb 2020 21:28)  HR: 72 (13 Feb 2020 11:05) (68 - 84)  BP: 121/69 (13 Feb 2020 11:05) (118/64 - 121/69)  RR: 16 (13 Feb 2020 11:05) (16 - 16)  SpO2: 98% (13 Feb 2020 11:05) (97% - 98%)    PHYSICAL EXAM:    General: young female, resting comfortably in bed  HEENT: normocephalic, atraumatic; PERRL, anicteric sclera; MMM  Neck: supple, no JVD, no thyromegaly, no lymphadenopathy  Cardiovascular: +S1/S2, RRR, no M/G/R  Respiratory: clear to auscultation B/L; no wheezing, no rales, no rhonchi  Gastrointestinal: soft, NT/ND; +BSx4, no organomegaly  Extremities: WWP; no edema, clubbing or cyanosis  Vascular: 2+ radial, DP/PT pulses B/L  Neurological: AAOx3; no focal deficits; reflexes 2+; strength 5/5 in all extremities; no finger to nose or heel to shin dysmetria    MEDICATIONS:  MEDICATIONS  (STANDING):  doxepin 25 milliGRAM(s) Oral at bedtime  fluvoxaMINE 100 milliGRAM(s) Oral <User Schedule>  folic acid 1 milliGRAM(s) Oral <User Schedule>  methylPREDNISolone sodium succinate Injectable 125 milliGRAM(s) IV Push <User Schedule>  Naltrexone 4.5MG Capsule 1 Capsule(s) 1 Capsule(s) Oral at bedtime  omega-3-Acid Ethyl Esters 1 Gram(s) Oral <User Schedule>  potassium chloride   Powder 40 milliEquivalent(s) Oral every 4 hours  predniSONE   Tablet 40 milliGRAM(s) Oral <User Schedule>    MEDICATIONS  (PRN):  acetaminophen   Tablet .. 650 milliGRAM(s) Oral every 6 hours PRN Temp greater or equal to 38.5C (101.3F), Mild Pain (1 - 3)  ibuprofen  Tablet. 600 milliGRAM(s) Oral every 8 hours PRN Severe Pain (7 - 10)  LORazepam   Injectable 2 milliGRAM(s) IV Push every 4 hours PRN Seizure activity      ALLERGIES:  Allergies    cefdinir (Hives)    Intolerances        LABS:                        11.8   14.31 )-----------( 264      ( 13 Feb 2020 10:13 )             35.7     02-13    140  |  105  |  17  ----------------------------<  91  3.3<L>   |  25  |  1.01    Ca    8.7      13 Feb 2020 10:12  Phos  3.3     02-12  Mg     2.0     02-13    TPro  6.5  /  Alb  4.0  /  TBili  x   /  DBili  x   /  AST  x   /  ALT  x   /  AlkPhos  x   02-13    PT/INR - ( 12 Feb 2020 12:24 )   PT: 11.4 sec;   INR: 1.00          PTT - ( 12 Feb 2020 12:24 )  PTT:25.7 sec    CAPILLARY BLOOD GLUCOSE          RADIOLOGY & ADDITIONAL TESTS: Reviewed.

## 2020-02-14 ENCOUNTER — TRANSCRIPTION ENCOUNTER (OUTPATIENT)
Age: 25
End: 2020-02-14

## 2020-02-14 VITALS
SYSTOLIC BLOOD PRESSURE: 111 MMHG | TEMPERATURE: 98 F | RESPIRATION RATE: 17 BRPM | HEART RATE: 71 BPM | OXYGEN SATURATION: 98 % | DIASTOLIC BLOOD PRESSURE: 72 MMHG

## 2020-02-14 LAB
ANION GAP SERPL CALC-SCNC: 10 MMOL/L — SIGNIFICANT CHANGE UP (ref 5–17)
BUN SERPL-MCNC: 11 MG/DL — SIGNIFICANT CHANGE UP (ref 7–23)
CALCIUM SERPL-MCNC: 9.2 MG/DL — SIGNIFICANT CHANGE UP (ref 8.4–10.5)
CHLORIDE SERPL-SCNC: 102 MMOL/L — SIGNIFICANT CHANGE UP (ref 96–108)
CO2 SERPL-SCNC: 24 MMOL/L — SIGNIFICANT CHANGE UP (ref 22–31)
CREAT SERPL-MCNC: 0.69 MG/DL — SIGNIFICANT CHANGE UP (ref 0.5–1.3)
GLUCOSE SERPL-MCNC: 154 MG/DL — HIGH (ref 70–99)
HCT VFR BLD CALC: 39.3 % — SIGNIFICANT CHANGE UP (ref 34.5–45)
HGB BLD-MCNC: 12.9 G/DL — SIGNIFICANT CHANGE UP (ref 11.5–15.5)
MAGNESIUM SERPL-MCNC: 2 MG/DL — SIGNIFICANT CHANGE UP (ref 1.6–2.6)
MCHC RBC-ENTMCNC: 30.8 PG — SIGNIFICANT CHANGE UP (ref 27–34)
MCHC RBC-ENTMCNC: 32.8 GM/DL — SIGNIFICANT CHANGE UP (ref 32–36)
MCV RBC AUTO: 93.8 FL — SIGNIFICANT CHANGE UP (ref 80–100)
NRBC # BLD: 0 /100 WBCS — SIGNIFICANT CHANGE UP (ref 0–0)
OLIGOCLONAL BANDS CSF ELPH-IMP: SIGNIFICANT CHANGE UP
PLATELET # BLD AUTO: 295 K/UL — SIGNIFICANT CHANGE UP (ref 150–400)
POTASSIUM SERPL-MCNC: 4.1 MMOL/L — SIGNIFICANT CHANGE UP (ref 3.5–5.3)
POTASSIUM SERPL-SCNC: 4.1 MMOL/L — SIGNIFICANT CHANGE UP (ref 3.5–5.3)
RBC # BLD: 4.19 M/UL — SIGNIFICANT CHANGE UP (ref 3.8–5.2)
RBC # FLD: 12.2 % — SIGNIFICANT CHANGE UP (ref 10.3–14.5)
SODIUM SERPL-SCNC: 136 MMOL/L — SIGNIFICANT CHANGE UP (ref 135–145)
WBC # BLD: 21.26 K/UL — HIGH (ref 3.8–10.5)
WBC # FLD AUTO: 21.26 K/UL — HIGH (ref 3.8–10.5)

## 2020-02-14 PROCEDURE — 95720 EEG PHY/QHP EA INCR W/VEEG: CPT

## 2020-02-14 PROCEDURE — 84100 ASSAY OF PHOSPHORUS: CPT

## 2020-02-14 PROCEDURE — 84157 ASSAY OF PROTEIN OTHER: CPT

## 2020-02-14 PROCEDURE — 80048 BASIC METABOLIC PNL TOTAL CA: CPT

## 2020-02-14 PROCEDURE — 83735 ASSAY OF MAGNESIUM: CPT

## 2020-02-14 PROCEDURE — 87483 CNS DNA AMP PROBE TYPE 12-25: CPT

## 2020-02-14 PROCEDURE — 84702 CHORIONIC GONADOTROPIN TEST: CPT

## 2020-02-14 PROCEDURE — 82945 GLUCOSE OTHER FLUID: CPT

## 2020-02-14 PROCEDURE — 85610 PROTHROMBIN TIME: CPT

## 2020-02-14 PROCEDURE — 84165 PROTEIN E-PHORESIS SERUM: CPT

## 2020-02-14 PROCEDURE — 99238 HOSP IP/OBS DSCHRG MGMT 30/<: CPT

## 2020-02-14 PROCEDURE — 85730 THROMBOPLASTIN TIME PARTIAL: CPT

## 2020-02-14 PROCEDURE — 80307 DRUG TEST PRSMV CHEM ANLYZR: CPT

## 2020-02-14 PROCEDURE — 85025 COMPLETE CBC W/AUTO DIFF WBC: CPT

## 2020-02-14 PROCEDURE — 86255 FLUORESCENT ANTIBODY SCREEN: CPT

## 2020-02-14 PROCEDURE — 85027 COMPLETE CBC AUTOMATED: CPT

## 2020-02-14 PROCEDURE — 86403 PARTICLE AGGLUT ANTBDY SCRN: CPT

## 2020-02-14 PROCEDURE — 83916 OLIGOCLONAL BANDS: CPT

## 2020-02-14 PROCEDURE — 80053 COMPREHEN METABOLIC PANEL: CPT

## 2020-02-14 PROCEDURE — 86592 SYPHILIS TEST NON-TREP QUAL: CPT

## 2020-02-14 PROCEDURE — 36415 COLL VENOUS BLD VENIPUNCTURE: CPT

## 2020-02-14 PROCEDURE — 89051 BODY FLUID CELL COUNT: CPT

## 2020-02-14 PROCEDURE — 93005 ELECTROCARDIOGRAM TRACING: CPT

## 2020-02-14 PROCEDURE — 83873 ASSAY OF CSF PROTEIN: CPT

## 2020-02-14 PROCEDURE — 84155 ASSAY OF PROTEIN SERUM: CPT

## 2020-02-14 RX ADMIN — Medication 600 MILLIGRAM(S): at 00:07

## 2020-02-14 RX ADMIN — Medication 1 GRAM(S): at 09:52

## 2020-02-14 RX ADMIN — Medication 600 MILLIGRAM(S): at 00:30

## 2020-02-14 RX ADMIN — FLUVOXAMINE MALEATE 100 MILLIGRAM(S): 25 TABLET ORAL at 09:51

## 2020-02-14 RX ADMIN — Medication 100 MILLIGRAM(S): at 14:12

## 2020-02-14 RX ADMIN — Medication 40 MILLIGRAM(S): at 09:51

## 2020-02-14 NOTE — DIETITIAN INITIAL EVALUATION ADULT. - ENERGY NEEDS
Ht: 5'7", Wt: 64kg, IBW: 61.4kg, 104.2%IBW.   Needs calculated based on [facility] standards of care.

## 2020-02-14 NOTE — DISCHARGE NOTE PROVIDER - NSDCFUADDAPPT_GEN_ALL_CORE_FT
Please call Dr. Vela's office to schedule a follow up appointment. We were not able to schedule it for you as her  is not in the office today.

## 2020-02-14 NOTE — DISCHARGE NOTE NURSING/CASE MANAGEMENT/SOCIAL WORK - PATIENT PORTAL LINK FT
You can access the FollowMyHealth Patient Portal offered by [facility] by registering at the following website: [email or web address]. By joining [employer]’s FollowMyHealth portal, you will also be able to view your health information using other applications (apps) compatible with our system.

## 2020-02-14 NOTE — DISCHARGE NOTE PROVIDER - CARE PROVIDER_API CALL
Karla Vela)  Neurology  [address] [place] [address]  Phone: [phone]  Fax: [phone]  Established Patient  Follow Up Time: 1 week

## 2020-02-14 NOTE — DISCHARGE NOTE PROVIDER - HOSPITAL COURSE
Patient is 23 yo F with past medical history of scarlet fever at age 9, anxiety, depression, OCD, and constellation of symptoms.    Presented with for work-up for autoimmune encephalitis.            Problem List/Main Diagnoses (system-based) & Inpatient treatment course:         Autoimmune encephalitis: Patient with symptoms of "brain fog", anxiety, depression, OCD after a strep infection at age 9    -VEEG monitoring for 2 days to evaluate for subclinical seizures    -Ativan 2mg IVP PRN for seizures > 2mins    -Neurological assessment Q8hrs    -Seizure & Fall precautions    -LP with CSF autoimmune studies to evaluate for evidence of inflammation - studies pending    - will trial solumedrol 125mg x1 can repeat x 1 today.  Will watch for 'rage', glucose control.        New medications: Prednisone is being tapered to 30mg daily starting 02/17/20    Labs to be followed outpatient: CSF studies    Exam to be followed outpatient: As per follow up with Dr. Vela Patient is 25 yo F with past medical history of scarlet fever at age 9, anxiety, depression, OCD, and constellation of symptoms.    Presented with for work-up for autoimmune encephalitis.        Problem List/Main Diagnoses (system-based) & Inpatient treatment course:         Autoimmune encephalitis: Patient with symptoms of "brain fog", anxiety, depression, OCD after a strep infection at age 9    -VEEG monitoring for 2 days to evaluate for subclinical seizures - negative.    -Ativan 2mg IVP PRN for seizures > 2mins    -Neurological assessment Q8hrs    -Seizure & Fall precautions    -LP with CSF autoimmune studies to evaluate for evidence of inflammation - studies pending    - solumedrol 125mg x1 and repeated: some internal frustration 'rage' but next day felt even closer to baseline glucose control.    - repeated 250mg on day of discharge.        New medications: Prednisone is being tapered to 30mg daily starting 02/17/20    Labs to be followed outpatient: CSF studies    Exam to be followed outpatient: As per follow up with Dr. Vela

## 2020-02-14 NOTE — DISCHARGE NOTE PROVIDER - NSDCMRMEDTOKEN_GEN_ALL_CORE_FT
doxepin 25 mg oral capsule: 1 cap(s) orally once a day (at bedtime)  Fish Oil 500 mg oral capsule: 2 cap(s) orally once a day  fluvoxaMINE 100 mg oral tablet: 1 tab(s) orally 2 times a day  folic acid 1 mg oral tablet: 1 tab(s) orally once a day  naltrexone: 4.5 milligram(s) orally once a day (at bedtime)  predniSONE 20 mg oral tablet: 2 tab(s) orally once a day  Xolair 150 mg subcutaneous injection: 2 dose(s) subcutaneous once a week doxepin 25 mg oral capsule: 1 cap(s) orally once a day (at bedtime)  Fish Oil 500 mg oral capsule: 2 cap(s) orally once a day  fluvoxaMINE 100 mg oral tablet: 1 tab(s) orally 2 times a day  folic acid 1 mg oral tablet: 1 tab(s) orally once a day  naltrexone: 4.5 milligram(s) orally once a day (at bedtime)  predniSONE 10 mg oral tablet: 3 tab(s) orally once a day   predniSONE 20 mg oral tablet: 2 tab(s) orally once a day  Xolair 150 mg subcutaneous injection: 2 dose(s) subcutaneous once a week

## 2020-02-14 NOTE — DIETITIAN INITIAL EVALUATION ADULT. - PROBLEM SELECTOR PLAN 1
1. Admit to EMU  2. VEEG monitoring for 2 days to evaluate for subclinical seizures  3. Ativan 2mg IVP PRN for seizures > 2mins  4. Labs  5. Neurological assessment Q8hrs  6. Seizure & Fall precautions  7. SCDs for DVT prophylaxis  8. LP with CSF autoimmune studies to evaluate for evidence of inflammation

## 2020-02-14 NOTE — DISCHARGE NOTE PROVIDER - NSDCCPCAREPLAN_GEN_ALL_CORE_FT
PRINCIPAL DISCHARGE DIAGNOSIS  Diagnosis: Autoimmune encephalitis  Assessment and Plan of Treatment: You were admitted for workup of autoimmune encephalitis as you were having symptoms that began after your strep infection at age 9. You were monitored with VEEG for 2 days to evaluate for subclinical seizures and no activity was captured. You were treated with solumedrol 125mg IV twice daily. An LP was done and CSF autoimmune studies to evaluate for evidence of inflammation were sent, but the results are pending. You must schedule followup with Dr. Vela as soon as possible.

## 2020-02-14 NOTE — DIETITIAN INITIAL EVALUATION ADULT. - OTHER INFO
Pt seen for initial assessment. 24 year-old-right-handed woman with PMH of scarlet fever at age 9, anxiety, depression, OCD, and constellation of symptoms who presents today for work-up for autoimmune encephalitis.  VEEG monitoring for 2 days to evaluate for subclinical seizures. LP with CSF autoimmune studies to evaluate for evidence of inflammation - studies pending. Skin: no edema, intact. Pt seen resting in bed, awake, alert, visitor at bedside. Pt is on regular diet with good PO intake. Reports good appetite x1 month PTA, however reports difficulty feeding self during January, was consuming mostly soft foods, protein smoothies, pediatlyte etc (now resolved, denies difficulty chewing/swallowing at this time). NKFA. Denies N/V, reports hx of C/D, however last BM 2/13, well formed, continue to monitor. Not reporting pain at this time. Wt hx unclear, pt does not weigh herself regularly, reports wt may have decreased during January, however reports that she has been gaining weight throughout February. Declines all ONS. RD to follow up per protocol.

## 2020-02-17 DIAGNOSIS — D89.89 OTHER SPECIFIED DISORDERS INVOLVING THE IMMUNE MECHANISM, NOT ELSEWHERE CLASSIFIED: ICD-10-CM

## 2020-02-17 LAB
AMPHIPHYSIN AB TITR CSF: NEGATIVE TITER — SIGNIFICANT CHANGE UP
CV2 IGG TITR CSF: NEGATIVE TITER — SIGNIFICANT CHANGE UP
GLIAL NUC TYPE 1 AB TITR CSF: NEGATIVE TITER — SIGNIFICANT CHANGE UP
HU1 AB TITR CSF IF: NEGATIVE TITER — SIGNIFICANT CHANGE UP
HU2 AB TITR CSF IF: NEGATIVE TITER — SIGNIFICANT CHANGE UP
HU3 AB TITR CSF: NEGATIVE TITER — SIGNIFICANT CHANGE UP
PARANEOPLASTIC INTERPRETATION, CSF: SIGNIFICANT CHANGE UP
PCA-TR AB TITR CSF: NEGATIVE TITER — SIGNIFICANT CHANGE UP
PURKINJE CELL CYTOPLASMIC AB TYPE 2: NEGATIVE TITER — SIGNIFICANT CHANGE UP
PURKINJE CELLS AB TITR CSF IF: NEGATIVE TITER — SIGNIFICANT CHANGE UP
REFLEX ADDED: SIGNIFICANT CHANGE UP
VDRL CSF-TITR: NEGATIVE — SIGNIFICANT CHANGE UP

## 2020-02-18 PROBLEM — F32.9 MAJOR DEPRESSIVE DISORDER, SINGLE EPISODE, UNSPECIFIED: Chronic | Status: ACTIVE | Noted: 2020-02-12

## 2020-02-18 PROBLEM — Z86.59 PERSONAL HISTORY OF OTHER MENTAL AND BEHAVIORAL DISORDERS: Chronic | Status: ACTIVE | Noted: 2020-02-12

## 2020-02-18 PROBLEM — F41.9 ANXIETY DISORDER, UNSPECIFIED: Chronic | Status: ACTIVE | Noted: 2020-02-12

## 2020-02-24 LAB
A-TUMOR NECROSIS FACT SERPL-MCNC: <5 PG/ML
GAD65 AB SER-MCNC: 0 NMOL/L
IL10 SERPL-MCNC: 6 PG/ML
IL12 SERPL-MCNC: <5 PG/ML
IL13 SERPL-MCNC: <5 PG/ML
IL2 SERPL-MCNC: 307 PG/ML
IL2 SERPL-MCNC: <5 PG/ML
IL4 SERPL-MCNC: <5 PG/ML
IL6 SERPL-MCNC: <5 PG/ML
IL8 SERPL-MCNC: <5 PG/ML
INTERFERON GAMMA: <5 PG/ML
INTERLEUKIN 1 BETA: <5 PG/ML
INTERLEUKIN 17: <5 PG/ML
INTERLEUKIN 5: <5 PG/ML
PARANEOPLASTIC AB PNL SER: NORMAL

## 2020-02-25 DIAGNOSIS — G04.81 OTHER ENCEPHALITIS AND ENCEPHALOMYELITIS: ICD-10-CM

## 2020-02-25 DIAGNOSIS — F42.9 OBSESSIVE-COMPULSIVE DISORDER, UNSPECIFIED: ICD-10-CM

## 2020-02-25 DIAGNOSIS — F41.9 ANXIETY DISORDER, UNSPECIFIED: ICD-10-CM

## 2020-02-25 DIAGNOSIS — F32.9 MAJOR DEPRESSIVE DISORDER, SINGLE EPISODE, UNSPECIFIED: ICD-10-CM

## 2020-02-28 LAB — MBP CSF-MCNC: <2 MCG/L — LOW (ref 2–4)

## 2020-03-06 ENCOUNTER — APPOINTMENT (OUTPATIENT)
Dept: NEUROLOGY | Facility: CLINIC | Age: 25
End: 2020-03-06
Payer: COMMERCIAL

## 2020-03-06 VITALS
TEMPERATURE: 98.1 F | OXYGEN SATURATION: 97 % | HEIGHT: 68 IN | HEART RATE: 107 BPM | WEIGHT: 147 LBS | BODY MASS INDEX: 22.28 KG/M2 | SYSTOLIC BLOOD PRESSURE: 132 MMHG | DIASTOLIC BLOOD PRESSURE: 84 MMHG

## 2020-03-06 PROCEDURE — 99215 OFFICE O/P EST HI 40 MIN: CPT

## 2020-03-06 RX ORDER — MELOXICAM 15 MG/1
15 TABLET ORAL
Qty: 20 | Refills: 0 | Status: DISCONTINUED | COMMUNITY
Start: 2020-02-17 | End: 2020-03-06

## 2020-03-06 NOTE — PHYSICAL EXAM
[FreeTextEntry1] : Physical Exam\par Constitutional: no apparent distress\par Psychiatric: normal affect, euthyhmic, alert and oriented x 3\par HEENT: moist mucous membranes, oropharynx clear\par Neck: supple, no lymphadenopathy\par Respiratory: clear to auscultation bilaterally, no crackles or wheezing\par Cardiovascular: regular rate and rhythm, normal S1/S2, no murmurs, no edema\par GI: soft, non-tender, non-distended, bowel sounds present; no hepatosplenomegaly on palpation\par Musculoskeletal: extremities warm, well-perfused, normal range of motion\par Skin: no rashes, bruises, or lesions\par \par Neurologic Exam:\par Mental Status: awake and alert, speech fluent and prosodic with no paraphasic errors\par Cranial Nerves: I: deferred; II: pupils equal round and reactive, visual fields full to confrontation III, IV, VI: extraocular movements full with no nystagmus; V: facial sensation intact and symmetric; VII: facial power symmetric; VIII: hearing intact to finger rub; IX/X: palate elevates symmetrically, no dysarthria; XI: shoulder shrug symmetric; XII: tongue protrudes midline\par Motor: normal bulk and tone, no orbiting or pronator drift, power 5/5 to confrontation throughout including shoulder abduction, elbow flexion and extension, wrist flexion and extension, hip flexion, no abnormal movements\par Sensation: intact to light touch in distal upper and lower extremities bilaterally\par Coordination: finger-nose-finger intact bilaterally\par Reflexes: 2+ biceps, triceps, brachioradialis, patella\par Gait: narrow base, normal stance and stride, normal arm swing

## 2020-03-06 NOTE — DATA REVIEWED
[de-identified] : Outside MRI brain personally reviewed, no temporal hyperintensity or other evidence of limbic encephalitis [de-identified] : EEG personally reviewed, normal [de-identified] : CSF with WBC 0, protein 30, glucose 84 during recent admission\par CSF and serum autoimmune panels negative

## 2020-03-06 NOTE — ASSESSMENT
[FreeTextEntry1] : 1) Possible autoimmune encephalitis\par -Taper steroids much mores slowly as she has done poorly with each reduction, plan to reduce by 2.5 mg every 2 weeks\par -PET scan to look for objective evidence of autoimmune encephalitis\par -If symptoms recur after stopping steroids, will re-treat with high dose IV steroids and.or IVIG\par \par 2) Steroid prophylaxis\par -Continue omeprazole and calcium/vitamin D\par -Discussed risks and benefits of PCP prophylaxis with patient and family as she has been on >20 mg of prednisone for >1 month, will defer prophylaxis at this time as she will go below 20 mg next week but will start Bactrim if she needs to increase dose for any reason\par \par RTC 3 months

## 2020-03-06 NOTE — HISTORY OF PRESENT ILLNESS
[FreeTextEntry1] : Feeling better today, after feeling much worse earlier in the week following steroid reduction to 20 mg.  Symptoms during this decompensation included severe pressure-like headache, anxiety, brain fog, and full body aching.\par \par She now thinks she may have gotten worse because she caught a cold from her dad, rather than because of the lower steroid dose.  She was able to go to work on Wednesday and Thursday without issues.

## 2020-05-06 ENCOUNTER — APPOINTMENT (OUTPATIENT)
Dept: NEUROLOGY | Facility: CLINIC | Age: 25
End: 2020-05-06
Payer: COMMERCIAL

## 2020-05-06 PROCEDURE — 99214 OFFICE O/P EST MOD 30 MIN: CPT | Mod: 95

## 2020-05-06 RX ORDER — OMEPRAZOLE 40 MG/1
40 CAPSULE, DELAYED RELEASE ORAL
Qty: 30 | Refills: 3 | Status: DISCONTINUED | COMMUNITY
Start: 2020-02-17 | End: 2020-05-06

## 2020-05-06 RX ORDER — PREDNISONE 5 MG/1
5 TABLET ORAL
Qty: 100 | Refills: 3 | Status: DISCONTINUED | COMMUNITY
Start: 2020-02-21 | End: 2020-05-06

## 2020-05-06 RX ORDER — NALTREXONE HYDROCHLORIDE 50 MG/1
TABLET, FILM COATED ORAL
Refills: 0 | Status: DISCONTINUED | COMMUNITY
End: 2020-05-06

## 2020-05-06 RX ORDER — METOCLOPRAMIDE 10 MG/1
10 TABLET ORAL 3 TIMES DAILY
Qty: 12 | Refills: 1 | Status: DISCONTINUED | COMMUNITY
Start: 2020-02-17 | End: 2020-05-06

## 2020-05-06 RX ORDER — CARIPRAZINE 1.5 MG/1
1.5 CAPSULE, GELATIN COATED ORAL
Refills: 0 | Status: ACTIVE | COMMUNITY

## 2020-06-05 ENCOUNTER — APPOINTMENT (OUTPATIENT)
Dept: NEUROLOGY | Facility: CLINIC | Age: 25
End: 2020-06-05

## 2020-07-20 NOTE — PHYSICAL EXAM
[FreeTextEntry1] : General: no apparent distress\par Mental Status: awake and alert, speech fluent and prosodic with no paraphasic errors\par Cranial Nerves: tracks in all directions, face symmetric, no dysarthria\par

## 2020-07-20 NOTE — DATA REVIEWED
[de-identified] : Normal on my review, moderate atrophy for age per [facility] report [de-identified] : Normal during EMU admission

## 2020-07-20 NOTE — ASSESSMENT
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 \par \par If she has another flare, would plan to retreat with solumedrol 1g IV x 3-5 days, but WITHOUT taper as she has several adverse effects from prolonged steroid course.  \par \par Will also relay this to patient's PCP Adan Cruz, [facility], [place], [place].\par \par 25 minutes of face-to-face time spent on this encounter of which >50% was spent in counseling the patient and her mother about the presumed diagnosis, diagnostic uncertainty, and plan of care.

## 2020-07-20 NOTE — HISTORY OF PRESENT ILLNESS
[Home] : at home, [unfilled] , at the time of the visit. [Medical Office: ([facility] [place])___] : at the medical office located in  [Mother] : mother [Patient] : the patient [Self] : self [FreeTextEntry4] : Mother [FreeTextEntry1] : Reports that she is nearly off of steroids and is feeling much better.  Currently taking 2.5 mg every other day with plans to stop at the end of the week.  She feels more like herself and very frustrated with the steroid side effects -- joint pain, back pain, bloating, acne, weight gain, puffiness in her face.  While coming off of steroids she has noticed some heart racing when standing as well as swelling in her knees, ankles, and feet.  She continues to have occasional "waves" of anxiety but is overall doing better emotionally.  Was recently started on Vraylar by her psychiatrist which she thinks is helping.

## 2022-05-29 NOTE — PATIENT PROFILE ADULT - HEALTH LITERACY
Pt to xray via stretcher at this time.      Bernardino Jeffers RN  05/29/22 [id number]
Pt up to [facility] with standby assistance. Urine collected and sent.       Angela Montana RN  05/29/22 [id number]
no

## 2022-11-03 NOTE — DIETITIAN INITIAL EVALUATION ADULT. - +GENDER
Recommendations     1. Continue current TF regimen of Novasource @ 35 mL/hr - meeting needs.   2. RD to monitor & follow-up.     Goals: Meet % EEN, EPN by RD f/u date  Nutrition Goal Status: goal met  Communication of RD Recs: reviewed with RN   Statement Selected